# Patient Record
Sex: FEMALE | Race: WHITE | NOT HISPANIC OR LATINO | Employment: FULL TIME | URBAN - METROPOLITAN AREA
[De-identification: names, ages, dates, MRNs, and addresses within clinical notes are randomized per-mention and may not be internally consistent; named-entity substitution may affect disease eponyms.]

---

## 2018-10-30 ENCOUNTER — OFFICE VISIT (OUTPATIENT)
Dept: URGENT CARE | Facility: CLINIC | Age: 53
End: 2018-10-30
Payer: COMMERCIAL

## 2018-10-30 VITALS
OXYGEN SATURATION: 98 % | HEART RATE: 65 BPM | BODY MASS INDEX: 19.67 KG/M2 | DIASTOLIC BLOOD PRESSURE: 75 MMHG | TEMPERATURE: 99 F | HEIGHT: 63 IN | RESPIRATION RATE: 20 BRPM | WEIGHT: 111 LBS | SYSTOLIC BLOOD PRESSURE: 121 MMHG

## 2018-10-30 DIAGNOSIS — R07.9 RIGHT-SIDED CHEST PAIN: Primary | ICD-10-CM

## 2018-10-30 PROCEDURE — 71046 X-RAY EXAM CHEST 2 VIEWS: CPT | Mod: S$GLB,,, | Performed by: RADIOLOGY

## 2018-10-30 PROCEDURE — 99203 OFFICE O/P NEW LOW 30 MIN: CPT | Mod: S$GLB,,, | Performed by: PHYSICIAN ASSISTANT

## 2018-10-30 RX ORDER — CLINDAMYCIN PHOSPHATE 10 UG/ML
LOTION TOPICAL
Refills: 3 | COMMUNITY
Start: 2018-08-15

## 2018-10-30 RX ORDER — TOBRAMYCIN AND DEXAMETHASONE 3; 1 MG/ML; MG/ML
SUSPENSION/ DROPS OPHTHALMIC
Refills: 0 | COMMUNITY
Start: 2018-09-28

## 2018-10-30 RX ORDER — PERMETHRIN 50 MG/G
CREAM TOPICAL
Refills: 2 | COMMUNITY
Start: 2018-08-07

## 2018-10-30 RX ORDER — HYDROCORTISONE ACETATE 25 MG/1
SUPPOSITORY RECTAL
Refills: 0 | COMMUNITY
Start: 2018-08-31

## 2018-10-30 RX ORDER — MOMETASONE FUROATE 1 MG/G
CREAM TOPICAL
Refills: 2 | COMMUNITY
Start: 2018-08-03

## 2018-10-30 RX ORDER — NORETHINDRONE ACETATE AND ETHINYL ESTRADIOL 1; 5 MG/1; UG/1
TABLET ORAL
Refills: 1 | COMMUNITY
Start: 2018-10-21

## 2018-10-30 NOTE — PROGRESS NOTES
"Subjective:       Patient ID: Hailey Locke is a 52 y.o. female.    Vitals:  height is 5' 3" (1.6 m) and weight is 50.3 kg (111 lb). Her tympanic temperature is 98.9 °F (37.2 °C). Her blood pressure is 121/75 and her pulse is 65. Her respiration is 20 and oxygen saturation is 98%.     Chief Complaint: Chest Pain    This is a 52 y.o. female   with History reviewed. No pertinent past medical history.   and History reviewed. No pertinent surgical history.  who presents today with a chief complaint of right sided chest pain that began three days ago.  The pain has been there constantly but does worsen with coughing, deep breathing, or sneezing.  Patient states that she felt slightly short of breath 3 days ago but that has resolved.  She denies fever cough or other URI symptoms.  She denies trauma.  She denies recent heavy lifting.      Chest Pain    This is a new problem. The current episode started in the past 7 days. The onset quality is sudden. The problem occurs constantly. The problem has been unchanged. The pain is present in the epigastric region. The pain is at a severity of 6/10. The pain is moderate. The quality of the pain is described as tightness. The pain radiates to the mid back. Associated symptoms include back pain, malaise/fatigue and shortness of breath. Pertinent negatives include no abdominal pain, dizziness, fever, nausea, near-syncope, palpitations, syncope or vomiting. The pain is aggravated by deep breathing. She has tried nothing for the symptoms. Risk factors include post-menopausal.     Review of Systems   Constitution: Positive for malaise/fatigue. Negative for chills and fever.   HENT: Negative for hoarse voice.    Eyes: Positive for blurred vision.   Cardiovascular: Positive for chest pain. Negative for leg swelling, near-syncope, palpitations and syncope.   Respiratory: Positive for shortness of breath.    Skin: Negative for rash.   Musculoskeletal: Positive for back pain. "   Gastrointestinal: Negative for abdominal pain, nausea and vomiting.   Neurological: Negative for dizziness and light-headedness.   Psychiatric/Behavioral: The patient is not nervous/anxious.        Objective:      Physical Exam   Constitutional: She is oriented to person, place, and time. She appears well-developed and well-nourished.   HENT:   Head: Normocephalic and atraumatic.   Eyes: Conjunctivae are normal.   Neck: Normal range of motion. Neck supple. No thyromegaly present.   Cardiovascular: Normal rate and regular rhythm. Exam reveals no gallop and no friction rub.   No murmur heard.  Pulmonary/Chest: Effort normal and breath sounds normal. She has no wheezes. She has no rales. She exhibits tenderness (minimal, right lower anterior chest wall).   Musculoskeletal: Normal range of motion.   Lymphadenopathy:     She has no cervical adenopathy.   Neurological: She is alert and oriented to person, place, and time.   Skin: Skin is warm and dry. No rash noted. No erythema.   Psychiatric: She has a normal mood and affect. Her behavior is normal. Judgment and thought content normal.   Nursing note and vitals reviewed.      11:25 AM - The EKG shows a normal, regular Sinus Rhythm, at a rate of 56, there are not ST Changes. There is not a previous EKG for comparison. This EKG was interpreted by me.    11:46 AM - Chest xray shows no acute abnormalities.  Patient's chest pain is located on the right side of her chest.  It is worse with certain movements, deep breathing, coughing, or sneezing.  It is slightly reproducible with palpation.  EKG and chest x-ray showed no acute abnormalities.  I do not feel further workup is necessary at this time.  I have advised anti-inflammatories and heat.  I have advised follow-up with PCP if her symptoms do not improve.  She should go to the ER for any new or worsening symptoms.      Xr Chest Pa And Lateral    Result Date: 10/30/2018  EXAMINATION: XR CHEST PA AND LATERAL CLINICAL  HISTORY: Chest pain, unspecified TECHNIQUE: PA and lateral views of the chest were performed. COMPARISON: None FINDINGS: The cardiomediastinal silhouette is normal in size and midline. Pulmonary vascularity appears within normal limits. The lungs appear clear without confluent pulmonary parenchymal opacity. No pleural fluid. Mild degenerative change in the thoracic spine.  No acute fracture or osseous destructive lesion.     No evidence of acute cardiopulmonary disease. Electronically signed by: Eduardo Billy MD Date:    10/30/2018 Time:    11:43      Assessment:       1. Right-sided chest pain        Plan:         Right-sided chest pain  -     IN OFFICE EKG 12-LEAD (to [facility])  -     XR CHEST PA AND LATERAL; Future; Expected date: 10/30/2018        Hailey was seen today for chest pain.    Diagnoses and all orders for this visit:    Right-sided chest pain  -     IN OFFICE EKG 12-LEAD (to [facility])  -     XR CHEST PA AND LATERAL; Future      Patient Instructions   - Rest.    - Drink plenty of fluids.    - Tylenol or Ibuprofen as directed as needed for fever/pain.   - No heavy lifting.    - Low heat to areas of pain for 20 minutes at a time.  - Go to the ER for any weakness or sensation loss.  - Follow up with your PCP or specialty clinic as directed in the next 1-2 weeks if not improved or as needed.  You can call [phone] to schedule an appointment with the appropriate provider.    - Go to the ED if your symptoms worsen.  - You must understand that you have received an Urgent Care treatment only and that you may be released before all of your medical problems are known or treated.   - You, the patient, will arrange for follow up care as instructed.   - If your condition worsens or fails to improve we recommend that you receive another evaluation at the ER immediately or contact your PCP to discuss your concerns or return here.      Uncertain Causes of Chest Pain    Chest pain can happen for a number of reasons.  Sometimes the cause can't be determined. If your condition does not seem serious, and your pain does not appear to be coming from your heart, your healthcare provider may recommend watching it closely. Sometimes the signs of a serious problem take more time to appear. Many problems not related to your heart can cause chest pain.These include:  · Musculoskeletal. Costochondritis, an inflammation of the tissues around the ribs that can occur from trauma or overuse injuries  · Respiratory. Pneumonia, pneumothorax, or pneumonitis (inflammation of the lining of the chest and lungs)  · Gastrointestinal. Esophageal reflux, heartburn, or gallbladder disease  · Anxiety and panic disorders  · Nerve compression and neuritis  · Miscellaneous problems such as aortic aneurysm or pulmonary embolism (a blood clot in the lungs)  Home care  After your visit, follow these recommendations:  · Rest today and avoid strenuous activity.  · Take any prescribed medicine as directed.  · Be aware of any recurrent chest pain and notice any changes  Follow-up care  Follow up with your healthcare provider if you do not start to feel better within 24 hours, or as advised.  Call 911  Call 911 if any of these occur:  · A change in the type of pain: if it feels different, becomes more severe, lasts longer, or begins to spread into your shoulder, arm, neck, jaw or back  · Shortness of breath or increased pain with breathing  · Weakness, dizziness, or fainting  · Rapid heart beat  · Crushing sensation in your chest  When to seek medical advice  Call your healthcare provider right away if any of the following occur:  · Cough with dark colored sputum (phlegm) or blood  · Fever of 100.4ºF (38ºC) or higher, or as directed by your healthcare provider  · Swelling, pain or redness in one leg  · Shortness of breath  Date Last Reviewed: 12/30/2015  © 1911-6363 [employer]. [address] [place] [address], [place] [address]. All rights reserved. This  information is not intended as a substitute for professional medical care. Always follow your healthcare professional's instructions.

## 2020-05-21 ENCOUNTER — LAB VISIT (OUTPATIENT)
Dept: PRIMARY CARE CLINIC | Facility: CLINIC | Age: 55
End: 2020-05-21
Payer: COMMERCIAL

## 2020-05-21 DIAGNOSIS — R05.9 COUGH: Primary | ICD-10-CM

## 2020-05-21 PROCEDURE — 86769 SARS-COV-2 COVID-19 ANTIBODY: CPT

## 2020-05-22 LAB — SARS-COV-2 IGG SERPLBLD QL IA.RAPID: NEGATIVE

## 2020-09-28 ENCOUNTER — LAB VISIT (OUTPATIENT)
Dept: PRIMARY CARE CLINIC | Facility: CLINIC | Age: 55
End: 2020-09-28

## 2020-09-28 DIAGNOSIS — R05.9 COUGH: ICD-10-CM

## 2020-09-30 LAB — SARS-COV-2 RNA RESP QL NAA+PROBE: NEGATIVE

## 2020-10-02 ENCOUNTER — LAB VISIT (OUTPATIENT)
Dept: PRIMARY CARE CLINIC | Facility: OTHER | Age: 55
End: 2020-10-02
Attending: INTERNAL MEDICINE
Payer: COMMERCIAL

## 2020-10-02 DIAGNOSIS — R05.9 COUGH: ICD-10-CM

## 2020-10-02 PROCEDURE — U0003 INFECTIOUS AGENT DETECTION BY NUCLEIC ACID (DNA OR RNA); SEVERE ACUTE RESPIRATORY SYNDROME CORONAVIRUS 2 (SARS-COV-2) (CORONAVIRUS DISEASE [COVID-19]), AMPLIFIED PROBE TECHNIQUE, MAKING USE OF HIGH THROUGHPUT TECHNOLOGIES AS DESCRIBED BY CMS-2020-01-R: HCPCS

## 2020-10-03 LAB — SARS-COV-2 RNA RESP QL NAA+PROBE: NOT DETECTED

## 2020-12-09 ENCOUNTER — OFFICE VISIT (OUTPATIENT)
Dept: DERMATOLOGY | Facility: CLINIC | Age: 55
End: 2020-12-09
Payer: COMMERCIAL

## 2020-12-09 DIAGNOSIS — K13.0 ANGULAR CHEILITIS: ICD-10-CM

## 2020-12-09 DIAGNOSIS — R21 RASH AND NONSPECIFIC SKIN ERUPTION: Primary | ICD-10-CM

## 2020-12-09 DIAGNOSIS — L20.89 OTHER ATOPIC DERMATITIS: ICD-10-CM

## 2020-12-09 PROCEDURE — 1126F AMNT PAIN NOTED NONE PRSNT: CPT | Mod: S$GLB,,, | Performed by: DERMATOLOGY

## 2020-12-09 PROCEDURE — 99999 PR PBB SHADOW E&M-EST. PATIENT-LVL III: ICD-10-PCS | Mod: PBBFAC,,, | Performed by: DERMATOLOGY

## 2020-12-09 PROCEDURE — 1126F PR PAIN SEVERITY QUANTIFIED, NO PAIN PRESENT: ICD-10-PCS | Mod: S$GLB,,, | Performed by: DERMATOLOGY

## 2020-12-09 PROCEDURE — 99999 PR PBB SHADOW E&M-EST. PATIENT-LVL III: CPT | Mod: PBBFAC,,, | Performed by: DERMATOLOGY

## 2020-12-09 PROCEDURE — 99202 PR OFFICE/OUTPT VISIT, NEW, LEVL II, 15-29 MIN: ICD-10-PCS | Mod: S$GLB,,, | Performed by: DERMATOLOGY

## 2020-12-09 PROCEDURE — 99202 OFFICE O/P NEW SF 15 MIN: CPT | Mod: S$GLB,,, | Performed by: DERMATOLOGY

## 2020-12-09 RX ORDER — NYSTATIN 100000 U/G
OINTMENT TOPICAL 2 TIMES DAILY PRN
Qty: 30 G | Refills: 3 | Status: SHIPPED | OUTPATIENT
Start: 2020-12-09

## 2020-12-09 RX ORDER — TACROLIMUS 1 MG/G
OINTMENT TOPICAL 2 TIMES DAILY
Qty: 30 G | Refills: 3 | Status: SHIPPED | OUTPATIENT
Start: 2020-12-09

## 2020-12-09 NOTE — PROGRESS NOTES
Subjective:       Patient ID:  Hailey Locke is a 54 y.o. female who presents for   Chief Complaint   Patient presents with    Rash     face     HPI   New patient. Here for evaluation of rash at eyelids, corners of mouth, neck; ongoing for 1.5 months, started initially at mouth then spread, prior treatment Eucrisa and steroid shot per outside dermatology - initially helped but recurred, Eucrisa burns. Hx sensitive skin in past; denies personal diagnosis of eczema, allergic rhinitis, asthma. Denies issues inside mouth.     Review of Systems   Constitutional: Negative for malaise.   Skin: Positive for daily sunscreen use and activity-related sunscreen use. Negative for recent sunburn and wears hat.   Hematologic/Lymphatic: Bruises/bleeds easily (bruise).        Objective:    Physical Exam   Constitutional: She appears well-developed and well-nourished. No distress.   Neurological: She is alert and oriented to person, place, and time. She is not disoriented.   Psychiatric: She has a normal mood and affect.   Skin:   Areas Examined (abnormalities noted in diagram):   Head / Face Inspection Performed  Neck Inspection Performed              Diagram Legend     Erythematous scaling macule/papule c/w actinic keratosis       Vascular papule c/w angioma      Pigmented verrucoid papule/plaque c/w seborrheic keratosis      Yellow umbilicated papule c/w sebaceous hyperplasia      Irregularly shaped tan macule c/w lentigo     1-2 mm smooth white papules consistent with Milia      Movable subcutaneous cyst with punctum c/w epidermal inclusion cyst      Subcutaneous movable cyst c/w pilar cyst      Firm pink to brown papule c/w dermatofibroma      Pedunculated fleshy papule(s) c/w skin tag(s)      Evenly pigmented macule c/w junctional nevus     Mildly variegated pigmented, slightly irregular-bordered macule c/w mildly atypical nevus      Flesh colored to evenly pigmented papule c/w intradermal nevus       Pink pearly  papule/plaque c/w basal cell carcinoma      Erythematous hyperkeratotic cursted plaque c/w SCC      Surgical scar with no sign of skin cancer recurrence      Open and closed comedones      Inflammatory papules and pustules      Verrucoid papule consistent consistent with wart     Erythematous eczematous patches and plaques     Dystrophic onycholytic nail with subungual debris c/w onychomycosis     Umbilicated papule    Erythematous-base heme-crusted tan verrucoid plaque consistent with inflamed seborrheic keratosis     Erythematous Silvery Scaling Plaque c/w Psoriasis     See annotation      Assessment / Plan:        Rash and nonspecific skin eruption    Other atopic dermatitis  -     tacrolimus (PROTOPIC) 0.1 % ointment; Apply topically 2 (two) times daily.  Dispense: 30 g; Refill: 3    Angular cheilitis  -     nystatin (MYCOSTATIN) ointment; Apply topically 2 (two) times daily as needed (rash).  Dispense: 30 g; Refill: 3      Favor contact dermatitis for periorbital and neck rash; rash at corners of mouth likely vy angular cheilitis vs contact.   Rec'd elimination of common culprits; provided safe list. Discussed that rash at mouth unlikely to be contact or else I would expect more perioral and/or intraoral involvement. However, to truly eliminate all possible culprits, provided safe oral list as well. Rec'd avoidance of licking corners of mouth. Discussed how to safely re-introduce products once rash is controlled.     Topicals as above. Counseled on potential SE of medication(s) and instructed on use.     Consideration for patch testing in future if needed.              Follow up in about 1 month (around 1/9/2021) for if not improved.

## 2020-12-21 ENCOUNTER — DOCUMENTATION ONLY (OUTPATIENT)
Dept: DERMATOLOGY | Facility: CLINIC | Age: 55
End: 2020-12-21

## 2020-12-21 NOTE — PROGRESS NOTES
[employer] approved Tacrolimus oint 0.1% from 12- to 12-.    Case #  [id number].  mem id #  [id number].

## 2021-01-13 ENCOUNTER — LAB VISIT (OUTPATIENT)
Dept: PRIMARY CARE CLINIC | Facility: OTHER | Age: 56
End: 2021-01-13
Attending: INTERNAL MEDICINE
Payer: COMMERCIAL

## 2021-01-13 DIAGNOSIS — Z20.822 ENCOUNTER FOR LABORATORY TESTING FOR COVID-19 VIRUS: ICD-10-CM

## 2021-01-13 PROCEDURE — U0003 INFECTIOUS AGENT DETECTION BY NUCLEIC ACID (DNA OR RNA); SEVERE ACUTE RESPIRATORY SYNDROME CORONAVIRUS 2 (SARS-COV-2) (CORONAVIRUS DISEASE [COVID-19]), AMPLIFIED PROBE TECHNIQUE, MAKING USE OF HIGH THROUGHPUT TECHNOLOGIES AS DESCRIBED BY CMS-2020-01-R: HCPCS

## 2021-01-14 LAB — SARS-COV-2 RNA RESP QL NAA+PROBE: NOT DETECTED
